# Patient Record
Sex: FEMALE | Race: WHITE | NOT HISPANIC OR LATINO | Employment: FULL TIME | URBAN - NONMETROPOLITAN AREA
[De-identification: names, ages, dates, MRNs, and addresses within clinical notes are randomized per-mention and may not be internally consistent; named-entity substitution may affect disease eponyms.]

---

## 2017-02-03 ENCOUNTER — OFFICE VISIT (OUTPATIENT)
Dept: RETAIL CLINIC | Facility: CLINIC | Age: 52
End: 2017-02-03

## 2017-02-03 VITALS
WEIGHT: 146.2 LBS | BODY MASS INDEX: 21.66 KG/M2 | RESPIRATION RATE: 14 BRPM | HEIGHT: 69 IN | TEMPERATURE: 99 F | OXYGEN SATURATION: 98 % | HEART RATE: 74 BPM

## 2017-02-03 DIAGNOSIS — J01.90 ACUTE SINUSITIS, RECURRENCE NOT SPECIFIED, UNSPECIFIED LOCATION: Primary | ICD-10-CM

## 2017-02-03 PROCEDURE — 99213 OFFICE O/P EST LOW 20 MIN: CPT | Performed by: NURSE PRACTITIONER

## 2017-02-03 RX ORDER — LEVOTHYROXINE AND LIOTHYRONINE 19; 4.5 UG/1; UG/1
30 TABLET ORAL DAILY
COMMUNITY

## 2017-02-03 RX ORDER — LEVOTHYROXINE SODIUM 0.1 MG/1
100 TABLET ORAL DAILY
COMMUNITY
End: 2017-02-03

## 2017-02-03 RX ORDER — FLUCONAZOLE 150 MG/1
150 TABLET ORAL DAILY
Qty: 2 TABLET | Refills: 0 | Status: SHIPPED | OUTPATIENT
Start: 2017-02-03 | End: 2017-04-18

## 2017-02-03 RX ORDER — AMOXICILLIN AND CLAVULANATE POTASSIUM 875; 125 MG/1; MG/1
1 TABLET, FILM COATED ORAL 2 TIMES DAILY
Qty: 20 TABLET | Refills: 0 | Status: SHIPPED | OUTPATIENT
Start: 2017-02-03 | End: 2017-02-13

## 2017-02-03 RX ORDER — PAROXETINE HYDROCHLORIDE 20 MG/1
20 TABLET, FILM COATED ORAL EVERY MORNING
COMMUNITY

## 2017-02-03 NOTE — PROGRESS NOTES
HPI Comments: Camille presents to the clinic today c/o sinus pressure/facial pain which started over a week ago. Associated symptoms include cough which is productive at times and wheezing. She has tried a number of otc medications without adequate relief in fact her symptoms are worsening. She does work at [employer] and has been exposed to multiple conditions. She had her Flu Vaccine this year. Refer to ROS for additional information.    URI    The current episode started 1 to 4 weeks ago. The problem has been rapidly worsening. The maximum temperature recorded prior to her arrival was 100.4 - 100.9 F. The fever has been present for 1 to 2 days. Associated symptoms include congestion, coughing, ear pain, rhinorrhea, sinus pain, a sore throat and wheezing. Pertinent negatives include no chest pain, nausea, rash or vomiting. She has tried acetaminophen, antihistamine, decongestant and NSAIDs for the symptoms. The treatment provided mild relief.      The following portions of the patient's history were reviewed and updated as appropriate: allergies, current medications, past family history, past medical history, past social history, past surgical history and problem list.    Review of Systems   Constitutional: Positive for appetite change, chills, fatigue and fever.   HENT: Positive for congestion, ear pain, postnasal drip, rhinorrhea, sinus pressure and sore throat.    Eyes: Negative for discharge and redness.   Respiratory: Positive for cough and wheezing. Negative for chest tightness and shortness of breath.    Cardiovascular: Negative for chest pain and leg swelling.   Gastrointestinal: Negative for nausea and vomiting.   Musculoskeletal: Negative for myalgias and neck stiffness.   Skin: Negative for color change and rash.   Hematological: Negative for adenopathy.   Psychiatric/Behavioral: Positive for sleep disturbance.   All other systems reviewed and are negative.    Physical Exam   Constitutional:  She is oriented to person, place, and time. She appears well-developed and well-nourished. No distress.   HENT:   Head: Normocephalic.   Right Ear: Ear canal normal. No tenderness. Tympanic membrane is bulging. Tympanic membrane is not scarred, not erythematous and not retracted. A middle ear effusion is present.   Left Ear: Ear canal normal. No tenderness. Tympanic membrane is bulging. Tympanic membrane is not scarred, not erythematous and not retracted. A middle ear effusion is present.   Nose: Mucosal edema and rhinorrhea present. No epistaxis. Right sinus exhibits maxillary sinus tenderness. Left sinus exhibits maxillary sinus tenderness.   Mouth/Throat: Uvula is midline and mucous membranes are normal. No uvula swelling. Oropharyngeal exudate and posterior oropharyngeal erythema present. No posterior oropharyngeal edema.   Eyes: Pupils are equal, round, and reactive to light. Right eye exhibits no discharge. Left eye exhibits no discharge. Right conjunctiva is not injected. Left conjunctiva is not injected.   Neck: Neck supple. No rigidity.   Cardiovascular: Normal rate, regular rhythm and normal heart sounds.    Pulmonary/Chest: Effort normal. No respiratory distress. She has no decreased breath sounds. She has no wheezes. She has no rhonchi. She has no rales.   Lymphadenopathy:     She has no cervical adenopathy.   Neurological: She is alert and oriented to person, place, and time.   Skin: Skin is warm and dry.   Psychiatric: She has a normal mood and affect. Her behavior is normal.   Nursing note and vitals reviewed.    Assessment/Plan   Diagnoses and all orders for this visit:    Acute sinusitis, recurrence not specified, unspecified location  Comments:  Findings and recommendations discussed with Camille. Treatment options reviewed.   Orders:  -     amoxicillin-clavulanate (AUGMENTIN) 875-125 MG per tablet; Take 1 tablet by mouth 2 (Two) Times a Day for 10 days. Take with food    Other orders  -      fluconazole (DIFLUCAN) 150 MG tablet; Take 1 tablet by mouth Daily.    Findings and recommendations discussed with Camille. Treatment options reviewed. Supportive care measure discussed. Camille does use a wood stove to heat her home and discussed humidification with the use of it. Encouraged smoking cessation.

## 2017-02-03 NOTE — PATIENT INSTRUCTIONS
Sinusitis, Adult  Sinusitis is redness, soreness, and puffiness (inflammation) of the air pockets in the bones of your face (sinuses). The redness, soreness, and puffiness can cause air and mucus to get trapped in your sinuses. This can allow germs to grow and cause an infection.   HOME CARE   · Drink enough fluids to keep your pee (urine) clear or pale yellow.  · Use a humidifier in your home.  · Run a hot shower to create steam in the bathroom. Sit in the bathroom with the door closed. Breathe in the steam 3-4 times a day.  · Put a warm, moist washcloth on your face 3-4 times a day, or as told by your doctor.  · Use salt water sprays (saline sprays) to wet the thick fluid in your nose. This can help the sinuses drain.  · Only take medicine as told by your doctor.  GET HELP RIGHT AWAY IF:   · Your pain gets worse.  · You have very bad headaches.  · You are sick to your stomach (nauseous).  · You throw up (vomit).  · You are very sleepy (drowsy) all the time.  · Your face is puffy (swollen).  · Your vision changes.  · You have a stiff neck.  · You have trouble breathing.  MAKE SURE YOU:   · Understand these instructions.  · Will watch your condition.  · Will get help right away if you are not doing well or get worse.     This information is not intended to replace advice given to you by your health care provider. Make sure you discuss any questions you have with your health care provider.     Document Released: 06/05/2009 Document Revised: 01/08/2016 Document Reviewed: 07/23/2013  [employer] Patient Education ©2016 [employer]  Smoking Cessation, Tips for Success  If you are ready to quit smoking, congratulations! You have chosen to help yourself be healthier. Cigarettes bring nicotine, tar, carbon monoxide, and other irritants into your body. Your lungs, heart, and blood vessels will be able to work better without these poisons. There are many different ways to quit smoking. Nicotine gum, nicotine  "patches, a nicotine inhaler, or nicotine nasal spray can help with physical craving. Hypnosis, support groups, and medicines help break the habit of smoking.  WHAT THINGS CAN I DO TO MAKE QUITTING EASIER?   Here are some tips to help you quit for good:  · Pick a date when you will quit smoking completely. Tell all of your friends and family about your plan to quit on that date.  · Do not try to slowly cut down on the number of cigarettes you are smoking. Pick a quit date and quit smoking completely starting on that day.  · Throw away all cigarettes.    · Clean and remove all ashtrays from your home, work, and car.  · On a card, write down your reasons for quitting. Carry the card with you and read it when you get the urge to smoke.  · Cleanse your body of nicotine. Drink enough water and fluids to keep your urine clear or pale yellow. Do this after quitting to flush the nicotine from your body.  · Learn to predict your moods. Do not let a bad situation be your excuse to have a cigarette. Some situations in your life might tempt you into wanting a cigarette.  · Never have \"just one\" cigarette. It leads to wanting another and another. Remind yourself of your decision to quit.  · Change habits associated with smoking. If you smoked while driving or when feeling stressed, try other activities to replace smoking. Stand up when drinking your coffee. Brush your teeth after eating. Sit in a different chair when you read the paper. Avoid alcohol while trying to quit, and try to drink fewer caffeinated beverages. Alcohol and caffeine may urge you to smoke.  · Avoid foods and drinks that can trigger a desire to smoke, such as sugary or spicy foods and alcohol.  · Ask people who smoke not to smoke around you.  · Have something planned to do right after eating or having a cup of coffee. For example, plan to take a walk or exercise.  · Try a relaxation exercise to calm you down and decrease your stress. Remember, you may be tense " "and nervous for the first 2 weeks after you quit, but this will pass.  · Find new activities to keep your hands busy. Play with a pen, coin, or rubber band. Doodle or draw things on paper.  · Brush your teeth right after eating. This will help cut down on the craving for the taste of tobacco after meals. You can also try mouthwash.    · Use oral substitutes in place of cigarettes. Try using lemon drops, carrots, cinnamon sticks, or chewing gum. Keep them handy so they are available when you have the urge to smoke.  · When you have the urge to smoke, try deep breathing.  · Designate your home as a nonsmoking area.  · If you are a heavy smoker, ask your health care provider about a prescription for nicotine chewing gum. It can ease your withdrawal from nicotine.  · Reward yourself. Set aside the cigarette money you save and buy yourself something nice.  · Look for support from others. Join a support group or smoking cessation program. Ask someone at home or at work to help you with your plan to quit smoking.  · Always ask yourself, \"Do I need this cigarette or is this just a reflex?\" Tell yourself, \"Today, I choose not to smoke,\" or \"I do not want to smoke.\" You are reminding yourself of your decision to quit.  · Do not replace cigarette smoking with electronic cigarettes (commonly called e-cigarettes). The safety of e-cigarettes is unknown, and some may contain harmful chemicals.  · If you relapse, do not give up! Plan ahead and think about what you will do the next time you get the urge to smoke.  HOW WILL I FEEL WHEN I QUIT SMOKING?  You may have symptoms of withdrawal because your body is used to nicotine (the addictive substance in cigarettes). You may crave cigarettes, be irritable, feel very hungry, cough often, get headaches, or have difficulty concentrating. The withdrawal symptoms are only temporary. They are strongest when you first quit but will go away within 10-14 days. When withdrawal symptoms occur, " stay in control. Think about your reasons for quitting. Remind yourself that these are signs that your body is healing and getting used to being without cigarettes. Remember that withdrawal symptoms are easier to treat than the major diseases that smoking can cause.   Even after the withdrawal is over, expect periodic urges to smoke. However, these cravings are generally short lived and will go away whether you smoke or not. Do not smoke!  WHAT RESOURCES ARE AVAILABLE TO HELP ME QUIT SMOKING?  Your health care provider can direct you to community resources or hospitals for support, which may include:  · Group support.  · Education.  · Hypnosis.  · Therapy.     This information is not intended to replace advice given to you by your health care provider. Make sure you discuss any questions you have with your health care provider.

## 2017-02-25 ENCOUNTER — OFFICE VISIT (OUTPATIENT)
Dept: RETAIL CLINIC | Facility: CLINIC | Age: 52
End: 2017-02-25

## 2017-02-25 VITALS
RESPIRATION RATE: 20 BRPM | WEIGHT: 141.8 LBS | OXYGEN SATURATION: 89 % | HEART RATE: 95 BPM | TEMPERATURE: 98.1 F | BODY MASS INDEX: 20.94 KG/M2

## 2017-02-25 DIAGNOSIS — R50.9 FEVER, UNSPECIFIED FEVER CAUSE: ICD-10-CM

## 2017-02-25 DIAGNOSIS — R05.8 PRODUCTIVE COUGH: Primary | ICD-10-CM

## 2017-02-25 PROCEDURE — 99212 OFFICE O/P EST SF 10 MIN: CPT | Performed by: NURSE PRACTITIONER

## 2017-02-25 NOTE — PROGRESS NOTES
Subjective   Camille Garvin is a 51 y.o. female.   Chief Complaint   Patient presents with   • Flu Symptoms    Camille presents to the clinic today c/o cough which started about 2 weeks ago. Associated symptoms include fever of 100 that has been present intermittently for a week.  Cough is productive cough.  She was seen here about 3 week ago and prescribed Augmentin for a sinus infection.  She completed her antibiotic and noticed some relief in symptoms initially.  Refer to ROS for additional information.    Flu Symptoms   This is a new problem. The current episode started 1 to 4 weeks ago. The problem has been gradually worsening. Associated symptoms include chills, congestion, coughing, fatigue, a fever, headaches, nausea and weakness. She has tried acetaminophen for the symptoms.        The following portions of the patient's history were reviewed and updated as appropriate: allergies, current medications, past family history, past medical history, past social history, past surgical history and problem list.    Review of Systems   Constitutional: Positive for chills, fatigue and fever.   HENT: Positive for congestion.    Respiratory: Positive for cough and chest tightness. Negative for shortness of breath.    Gastrointestinal: Positive for nausea.   Neurological: Positive for weakness and headaches.       No Known Allergies    Visit Vitals   • Pulse 95   • Temp 98.1 °F (36.7 °C) (Temporal Artery )   • Resp 20   • Wt 141 lb 12.8 oz (64.3 kg)   • SpO2 (!) 89%   • BMI 20.94 kg/m2         Objective     Physical Exam   Constitutional: She appears well-developed and well-nourished.   Cardiovascular: Normal rate and regular rhythm.    Pulmonary/Chest: Effort normal. She has wheezes. She has rhonchi.   Nursing note and vitals reviewed.      Assessment/Plan   Camille was seen today for flu symptoms.    Diagnoses and all orders for this visit:    Productive cough    Fever, unspecified fever cause               Explained to  patient that due to her oxygen saturation and the fever that has persisted for one week I felt that she needed to seek a higher level of care.  Discussed being seen at urgent care or the Emergency Department.  Patient verbalized understanding and said that she would go to the urgent care.

## 2017-04-18 ENCOUNTER — OFFICE VISIT (OUTPATIENT)
Dept: RETAIL CLINIC | Facility: CLINIC | Age: 52
End: 2017-04-18

## 2017-04-18 VITALS
HEART RATE: 108 BPM | SYSTOLIC BLOOD PRESSURE: 99 MMHG | OXYGEN SATURATION: 96 % | RESPIRATION RATE: 18 BRPM | DIASTOLIC BLOOD PRESSURE: 64 MMHG | BODY MASS INDEX: 20.59 KG/M2 | WEIGHT: 139.4 LBS | TEMPERATURE: 100.5 F

## 2017-04-18 DIAGNOSIS — J02.9 SORE THROAT: ICD-10-CM

## 2017-04-18 DIAGNOSIS — R50.9 FEVER, UNSPECIFIED FEVER CAUSE: ICD-10-CM

## 2017-04-18 DIAGNOSIS — R05.9 COUGH: Primary | ICD-10-CM

## 2017-04-18 LAB
EXPIRATION DATE: NORMAL
EXPIRATION DATE: NORMAL
FLUAV AG NPH QL: NORMAL
FLUBV AG NPH QL: NORMAL
INTERNAL CONTROL: NORMAL
INTERNAL CONTROL: NORMAL
Lab: NORMAL
Lab: NORMAL
S PYO AG THROAT QL: NEGATIVE

## 2017-04-18 PROCEDURE — 99213 OFFICE O/P EST LOW 20 MIN: CPT | Performed by: NURSE PRACTITIONER

## 2017-04-18 PROCEDURE — 87880 STREP A ASSAY W/OPTIC: CPT | Performed by: NURSE PRACTITIONER

## 2017-04-18 PROCEDURE — 87804 INFLUENZA ASSAY W/OPTIC: CPT | Performed by: NURSE PRACTITIONER

## 2017-04-18 RX ORDER — ALBUTEROL SULFATE 90 UG/1
2 AEROSOL, METERED RESPIRATORY (INHALATION) EVERY 4 HOURS PRN
COMMUNITY
End: 2017-11-18

## 2017-04-18 RX ORDER — LEVOFLOXACIN 500 MG/1
500 TABLET, FILM COATED ORAL DAILY
Qty: 5 TABLET | Refills: 0 | Status: SHIPPED | OUTPATIENT
Start: 2017-04-18 | End: 2017-11-18

## 2017-04-18 NOTE — PROGRESS NOTES
"Subjective   Camille Garvin is a 51 y.o. female.     Chief Complaint   Patient presents with   • Flu Symptoms      History of Present Illness   Presents to [facility] with c/o \"flu symptoms\" describes as body aches with low grade temperature for past few days. Has had associated productive cough with pale yellow colored sputum, cough and wheezing at times. Denies any chest pain. Has PMH of pneumonia approximately 2 months ago, hx of COPD and cigarette smoker. Taking OTC medication for the cough.   Possible ill contact with flu and strep at work.     The following portions of the patient's history were reviewed and updated as appropriate: allergies, current medications, past family history, past medical history, past social history, past surgical history and problem list.      Current Outpatient Prescriptions:   •  albuterol (PROVENTIL HFA;VENTOLIN HFA) 108 (90 BASE) MCG/ACT inhaler, Inhale 2 puffs Every 4 (Four) Hours As Needed for Wheezing., Disp: , Rfl:   •  MULTIPLE VITAMINS PO, Take  by mouth., Disp: , Rfl:   •  PARoxetine (PAXIL) 20 MG tablet, Take 20 mg by mouth Every Morning., Disp: , Rfl:   •  Thyroid 30 MG PO tablet, Take 30 mg by mouth Daily., Disp: , Rfl:   •  levoFLOXacin (LEVAQUIN) 500 MG tablet, Take 1 tablet by mouth Daily., Disp: 5 tablet, Rfl: 0    BP 99/64  Pulse 108  Temp 100.5 °F (38.1 °C) (Oral)   Resp 18  Wt 139 lb 6.4 oz (63.2 kg)  SpO2 96%  BMI 20.59 kg/m2    Review of Systems   Constitutional: Positive for chills and fever.   HENT: Positive for postnasal drip, rhinorrhea and sore throat. Negative for ear pain and sinus pressure.    Eyes: Negative for itching.   Respiratory: Positive for cough.    Gastrointestinal: Negative for vomiting.   Skin: Negative for color change.   Neurological: Negative for dizziness.      No Known Allergies    Physical Exam   Constitutional: She is oriented to person, place, and time. She appears well-developed and well-nourished.   HENT:   Head: Normocephalic.   Right " Ear: Tympanic membrane normal.   Left Ear: Tympanic membrane normal.   Mouth/Throat: No uvula swelling. Posterior oropharyngeal erythema (mild) present.   Cardiovascular: Regular rhythm.    Pulmonary/Chest: Effort normal. Decreased breath sounds: RLL. She has no wheezes. She has no rhonchi. She has no rales.   Neurological: She is alert and oriented to person, place, and time.   Skin: Skin is warm and dry.     Assessment/Plan     Camille was seen today for flu symptoms.    Diagnoses and all orders for this visit:    Cough  -     levoFLOXacin (LEVAQUIN) 500 MG tablet; Take 1 tablet by mouth Daily.    Fever, unspecified fever cause  -     levoFLOXacin (LEVAQUIN) 500 MG tablet; Take 1 tablet by mouth Daily.  -     POC Influenza A / B    Sore throat  -     POC Rapid Strep A      Results for orders placed or performed in visit on 04/18/17   POC Influenza A / B   Result Value Ref Range    Rapid Influenza A Ag neg     Rapid Influenza B Ag neg     Internal Control Passed Passed    Lot Number [id number]     Expiration Date 2/2019    POC Rapid Strep A   Result Value Ref Range    Rapid Strep A Screen Negative Negative, VALID, INVALID, Not Performed    Internal Control Passed Passed    Lot Number [id number]     Expiration Date 09/30/2018         Continue home medication for the cough. Discussed f/u   with PCP or at the Urgent Care if symptoms worsen or fail to improve within next 24-48 hours.  Patient teaching information discussed and provided to the patient. Patient verbalized understanding.      April 18, 2017 11:55 AM

## 2017-04-18 NOTE — PATIENT INSTRUCTIONS
"      Upper Respiratory Infection, Adult  Most upper respiratory infections (URIs) are caused by a virus. A URI affects the nose, throat, and upper air passages. The most common type of URI is often called \"the common cold.\"  HOME CARE   · Take medicines only as told by your doctor.  · Gargle warm saltwater or take cough drops to comfort your throat as told by your doctor.  · Use a warm mist humidifier or inhale steam from a shower to increase air moisture. This may make it easier to breathe.  · Drink enough fluid to keep your pee (urine) clear or pale yellow.  · Eat soups and other clear broths.  · Have a healthy diet.  · Rest as needed.  · Go back to work when your fever is gone or your doctor says it is okay.  ¨ You may need to stay home longer to avoid giving your URI to others.  ¨ You can also wear a face mask and wash your hands often to prevent spread of the virus.  · Use your inhaler more if you have asthma.  · Do not use any tobacco products, including cigarettes, chewing tobacco, or electronic cigarettes. If you need help quitting, ask your doctor.  GET HELP IF:  · You are getting worse, not better.  · Your symptoms are not helped by medicine.  · You have chills.  · You are getting more short of breath.  · You have brown or red mucus.  · You have yellow or brown discharge from your nose.  · You have pain in your face, especially when you bend forward.  · You have a fever.  · You have puffy (swollen) neck glands.  · You have pain while swallowing.  · You have white areas in the back of your throat.  GET HELP RIGHT AWAY IF:   · You have very bad or constant:    Headache.    Ear pain.    Pain in your forehead, behind your eyes, and over your cheekbones (sinus pain).    Chest pain.  · You have long-lasting (chronic) lung disease and any of the following:    Wheezing.    Long-lasting cough.    Coughing up blood.    A change in your usual mucus.  · You have a stiff neck.  · You have changes in your:    Vision.   "  Hearing.    Thinking.    Mood.  MAKE SURE YOU:   · Understand these instructions.  · Will watch your condition.  · Will get help right away if you are not doing well or get worse.     This information is not intended to replace advice given to you by your health care provider. Make sure you discuss any questions you have with your health care provider.     Document Released: 06/05/2009 Document Revised: 05/03/2016 Document Reviewed: 03/25/2015  [employer] Interactive Patient Education ©2016 [employer]    Schedule a f/u a visit with your PCP within nex 5-7 days. If symptoms fail to improve or worsen f/u at  or ED.

## 2017-11-18 ENCOUNTER — OFFICE VISIT (OUTPATIENT)
Dept: RETAIL CLINIC | Facility: CLINIC | Age: 52
End: 2017-11-18

## 2017-11-18 VITALS — HEART RATE: 79 BPM | WEIGHT: 142.6 LBS | OXYGEN SATURATION: 97 % | TEMPERATURE: 99 F | BODY MASS INDEX: 21.06 KG/M2

## 2017-11-18 DIAGNOSIS — J06.9 URI, ACUTE: Primary | ICD-10-CM

## 2017-11-18 PROCEDURE — 99213 OFFICE O/P EST LOW 20 MIN: CPT | Performed by: NURSE PRACTITIONER

## 2017-11-18 RX ORDER — FLUTICASONE PROPIONATE 50 MCG
2 SPRAY, SUSPENSION (ML) NASAL DAILY
Qty: 1 BOTTLE | Refills: 0 | Status: SHIPPED | OUTPATIENT
Start: 2017-11-18

## 2017-11-18 RX ORDER — VARENICLINE TARTRATE 0.5 MG/1
0.5 TABLET, FILM COATED ORAL 2 TIMES DAILY
COMMUNITY

## 2017-11-18 RX ORDER — AZITHROMYCIN 250 MG/1
TABLET, FILM COATED ORAL
Qty: 6 TABLET | Refills: 0 | Status: SHIPPED | OUTPATIENT
Start: 2017-11-18 | End: 2019-05-11

## 2017-11-18 NOTE — PATIENT INSTRUCTIONS
"Upper Respiratory Infection, Adult  Most upper respiratory infections (URIs) are a viral infection of the air passages leading to the lungs. A URI affects the nose, throat, and upper air passages. The most common type of URI is nasopharyngitis and is typically referred to as \"the common cold.\"  URIs run their course and usually go away on their own. Most of the time, a URI does not require medical attention, but sometimes a bacterial infection in the upper airways can follow a viral infection. This is called a secondary infection. Sinus and middle ear infections are common types of secondary upper respiratory infections.  Bacterial pneumonia can also complicate a URI. A URI can worsen asthma and chronic obstructive pulmonary disease (COPD). Sometimes, these complications can require emergency medical care and may be life threatening.   CAUSES  Almost all URIs are caused by viruses. A virus is a type of germ and can spread from one person to another.   RISKS FACTORS  You may be at risk for a URI if:   · You smoke.    · You have chronic heart or lung disease.  · You have a weakened defense (immune) system.    · You are very young or very old.    · You have nasal allergies or asthma.  · You work in crowded or poorly ventilated areas.  · You work in health care facilities or schools.  SIGNS AND SYMPTOMS   Symptoms typically develop 2-3 days after you come in contact with a cold virus. Most viral URIs last 7-10 days. However, viral URIs from the influenza virus (flu virus) can last 14-18 days and are typically more severe. Symptoms may include:   · Runny or stuffy (congested) nose.    · Sneezing.    · Cough.    · Sore throat.    · Headache.    · Fatigue.    · Fever.    · Loss of appetite.    · Pain in your forehead, behind your eyes, and over your cheekbones (sinus pain).  · Muscle aches.    DIAGNOSIS   Your health care provider may diagnose a URI by:  · Physical exam.  · Tests to check that your symptoms are not due to " another condition such as:  ¨ Strep throat.  ¨ Sinusitis.  ¨ Pneumonia.  ¨ Asthma.  TREATMENT   A URI goes away on its own with time. It cannot be cured with medicines, but medicines may be prescribed or recommended to relieve symptoms. Medicines may help:  · Reduce your fever.  · Reduce your cough.  · Relieve nasal congestion.  HOME CARE INSTRUCTIONS   · Take medicines only as directed by your health care provider.    · Gargle warm saltwater or take cough drops to comfort your throat as directed by your health care provider.  · Use a warm mist humidifier or inhale steam from a shower to increase air moisture. This may make it easier to breathe.  · Drink enough fluid to keep your urine clear or pale yellow.    · Eat soups and other clear broths and maintain good nutrition.    · Rest as needed.    · Return to work when your temperature has returned to normal or as your health care provider advises. You may need to stay home longer to avoid infecting others. You can also use a face mask and careful hand washing to prevent spread of the virus.  · Increase the usage of your inhaler if you have asthma.    · Do not use any tobacco products, including cigarettes, chewing tobacco, or electronic cigarettes. If you need help quitting, ask your health care provider.  PREVENTION   The best way to protect yourself from getting a cold is to practice good hygiene.   · Avoid oral or hand contact with people with cold symptoms.    · Wash your hands often if contact occurs.    There is no clear evidence that vitamin C, vitamin E, echinacea, or exercise reduces the chance of developing a cold. However, it is always recommended to get plenty of rest, exercise, and practice good nutrition.   SEEK MEDICAL CARE IF:   · You are getting worse rather than better.    · Your symptoms are not controlled by medicine.    · You have chills.  · You have worsening shortness of breath.  · You have brown or red mucus.  · You have yellow or brown nasal  discharge.  · You have pain in your face, especially when you bend forward.  · You have a fever.  · You have swollen neck glands.  · You have pain while swallowing.  · You have white areas in the back of your throat.  SEEK IMMEDIATE MEDICAL CARE IF:   · You have severe or persistent:    Headache.    Ear pain.    Sinus pain.    Chest pain.  · You have chronic lung disease and any of the following:    Wheezing.    Prolonged cough.    Coughing up blood.    A change in your usual mucus.  · You have a stiff neck.  · You have changes in your:    Vision.    Hearing.    Thinking.    Mood.  MAKE SURE YOU:   · Understand these instructions.  · Will watch your condition.  · Will get help right away if you are not doing well or get worse.     This information is not intended to replace advice given to you by your health care provider. Make sure you discuss any questions you have with your health care provider.     Document Released: 06/13/2002 Document Revised: 05/03/2016 Document Reviewed: 03/25/2015  [employer] Interactive Patient Education ©2017 [employer]

## 2017-11-18 NOTE — PROGRESS NOTES
Subjective   Camille Garvin is a 52 y.o. female.     Chief Complaint   Patient presents with   • Nasal Congestion   • Cough      Cough   This is a new problem. The current episode started in the past 7 days. The problem has been gradually worsening. The cough is productive of purulent sputum. Associated symptoms include ear congestion, headaches, myalgias, nasal congestion, postnasal drip, rhinorrhea and a sore throat. Pertinent negatives include no chest pain, chills, ear pain, fever or wheezing. Nothing aggravates the symptoms. Treatments tried: Benadryl. The treatment provided mild relief. Current every day smoker      The following portions of the patient's history were reviewed and updated as appropriate: allergies, current medications, past family history, past medical history, past social history, past surgical history and problem list.      Current Outpatient Prescriptions:   •  azithromycin (ZITHROMAX Z-ALIX) 250 MG tablet, Take 2 tablets the first day, then 1 tablet daily for 4 days., Disp: 6 tablet, Rfl: 0  •  fluticasone (FLONASE) 50 MCG/ACT nasal spray, 2 sprays into each nostril Daily., Disp: 1 bottle, Rfl: 0  •  MULTIPLE VITAMINS PO, Take  by mouth., Disp: , Rfl:   •  PARoxetine (PAXIL) 20 MG tablet, Take 20 mg by mouth every morning., Disp: , Rfl:   •  PARoxetine (PAXIL) 20 MG tablet, Take 20 mg by mouth Every Morning., Disp: , Rfl:   •  thyroid (ARMOUR) 120 MG tablet, Take 120 mg by mouth daily., Disp: , Rfl:   •  Thyroid 30 MG PO tablet, Take 30 mg by mouth Daily., Disp: , Rfl:   •  varenicline (CHANTIX) 0.5 MG tablet, Take 0.5 mg by mouth 2 (Two) Times a Day., Disp: , Rfl:     Pulse 79  Temp 99 °F (37.2 °C)  Wt 142 lb 9.6 oz (64.7 kg)  SpO2 97%  BMI 21.06 kg/m2    Review of Systems   Constitutional: Negative for chills and fever.   HENT: Positive for congestion, postnasal drip, rhinorrhea and sore throat. Negative for ear pain and sinus pressure.    Respiratory: Positive for cough (productive  yellow sputum). Negative for wheezing.    Cardiovascular: Negative for chest pain.   Musculoskeletal: Positive for myalgias.   Skin: Negative for color change.   Neurological: Positive for headaches.     No Known Allergies    Physical Exam   Constitutional: She is oriented to person, place, and time. She appears well-developed and well-nourished.   HENT:   Head: Normocephalic.   Right Ear: Tympanic membrane is bulging. Tympanic membrane is not erythematous.   Left Ear: Tympanic membrane is bulging. Tympanic membrane is not erythematous.   Nose: Mucosal edema and rhinorrhea present.   Mouth/Throat: Posterior oropharyngeal erythema present. No posterior oropharyngeal edema.   Eyes: Conjunctivae are normal.   Cardiovascular: Normal rate and regular rhythm.    Pulmonary/Chest: Effort normal and breath sounds normal. She has no wheezes.   Lymphadenopathy:     She has no cervical adenopathy.   Neurological: She is alert and oriented to person, place, and time.   Skin: Skin is warm and dry.      Assessment/Plan     Camille was seen today for nasal congestion and cough.    Diagnoses and all orders for this visit:    URI, acute  -     azithromycin (ZITHROMAX Z-ALIX) 250 MG tablet; Take 2 tablets the first day, then 1 tablet daily for 4 days.  -     fluticasone (FLONASE) 50 MCG/ACT nasal spray; 2 sprays into each nostril Daily.         Discussed PE findings and treatment plan.   Follow up with PCP or at the Urgent Care if symptoms worsen or fail to improve.  Patient teaching information discussed and provided to the patient. Patient verbalized understanding.      November 18, 2017 11:15 AM

## 2019-05-11 ENCOUNTER — OFFICE VISIT (OUTPATIENT)
Dept: RETAIL CLINIC | Facility: CLINIC | Age: 54
End: 2019-05-11

## 2019-05-11 VITALS
RESPIRATION RATE: 18 BRPM | DIASTOLIC BLOOD PRESSURE: 60 MMHG | WEIGHT: 131.4 LBS | BODY MASS INDEX: 19.4 KG/M2 | TEMPERATURE: 99.2 F | SYSTOLIC BLOOD PRESSURE: 100 MMHG | HEART RATE: 80 BPM | OXYGEN SATURATION: 98 %

## 2019-05-11 DIAGNOSIS — J06.9 URI, ACUTE: ICD-10-CM

## 2019-05-11 DIAGNOSIS — J30.89 ENVIRONMENTAL AND SEASONAL ALLERGIES: Primary | ICD-10-CM

## 2019-05-11 PROCEDURE — 99213 OFFICE O/P EST LOW 20 MIN: CPT | Performed by: NURSE PRACTITIONER

## 2019-05-11 RX ORDER — METHYLPREDNISOLONE 4 MG/1
TABLET ORAL
Qty: 1 EACH | Refills: 0 | Status: SHIPPED | OUTPATIENT
Start: 2019-05-11

## 2019-05-11 RX ORDER — AMOXICILLIN AND CLAVULANATE POTASSIUM 875; 125 MG/1; MG/1
1 TABLET, FILM COATED ORAL 2 TIMES DAILY
Qty: 20 TABLET | Refills: 0 | Status: SHIPPED | OUTPATIENT
Start: 2019-05-11 | End: 2019-05-21

## 2019-05-11 NOTE — PATIENT INSTRUCTIONS
Allergies, Adult  An allergy means that your body reacts to something that bothers it (allergen). It is not a normal reaction. This can happen from something that you:  · Eat.  · Breathe in.  · Touch.    You can have an allergy (be allergic) to:  · Outdoor things, like:  ? Pollen.  ? Grass.  ? Weeds.  · Indoor things, like:  ? Dust.  ? Smoke.  ? Pet dander.  · Foods.  · Medicines.  · Things that bother your skin, like:  ? Detergents.  ? Chemicals.  ? Latex.  · Perfume.  · Bugs.    An allergy cannot spread from person to person (is not contagious).  Follow these instructions at home:  · Stay away from things that you know you are allergic to.  · If you have allergies to things in the air, wash out your nose each day. Do it with one of these:  ? A salt-water (saline) spray.  ? A container (neti pot).  · Take over-the-counter and prescription medicines only as told by your doctor.  · Keep all follow-up visits as told by your doctor. This is important.  · If you are at risk for a very bad allergy reaction (anaphylaxis), keep an auto-injector with you all the time. This is called an epinephrine injection.  ? This is pre-measured medicine with a needle. You can put it into your skin by yourself.  ? Right after you have a very bad allergy reaction, you or a person with you must give the medicine in less than a few minutes. This is an emergency.  · If you have ever had a very bad allergy reaction, wear a medical alert bracelet or necklace. Your very bad allergy should be written on it.  Contact a health care provider if:  · Your symptoms do not get better with treatment.  Get help right away if:  · You have symptoms of a very bad allergy reaction. These include:  ? A swollen mouth, tongue, or throat.  ? Pain or tightness in your chest.  ? Trouble breathing.  ? Being short of breath.  ? Dizziness.  ? Fainting.  ? Very bad pain in your belly (abdomen).  ? Throwing up (vomiting).  ? Watery poop (diarrhea).  Summary  · An  allergy means that your body reacts to something that bothers it (allergen). It is not a normal reaction.  · Stay away from things that make your body react.  · Take over-the-counter and prescription medicines only as told by your doctor.  · If you are at risk for a very bad allergy reaction, carry an auto-injector (epinephrine injection) all the time. Also, wear a medical alert bracelet or necklace so people know about your allergy.  This information is not intended to replace advice given to you by your health care provider. Make sure you discuss any questions you have with your health care provider.  Document Released: 04/14/2014 Document Revised: 04/02/2018 Document Reviewed: 04/02/2018  [employer] Interactive Patient Education © 2019 [employer]    Nasal Allergies  Nasal allergies are a reaction to allergens in the air. Allergens are particles in the air that cause your body to have an allergic reaction. Nasal allergies are not passed from person to person (are not contagious). They cannot be cured, but they can be controlled.  What are the causes?  Seasonal nasal allergies (hay fever) are caused by pollen allergens that come from grasses, trees, and weeds. Year-round nasal allergies (perennial allergic rhinitis) are caused by allergens such as house dust mites, pet dander, and mold spores.  What increases the risk?  The following factors may make you more likely to develop this condition:  · Having certain health conditions. These include:  ? Other types of allergies, such as food allergies.  ? Asthma.  ? Eczema.  · Having a close relative who has allergies or asthma.  · Exposure to house dust, pollen, dander, or other allergens at home or at work.  · Exposure to air pollution or secondhand smoke when you were a child.    What are the signs or symptoms?  Symptoms of this condition include:  · Sneezing.  · Runny nose or stuffy nose (congestion).  · Watery (tearing) eyes.  · Itchy eyes, nose, mouth, throat,  skin, or other area.  · Sore throat.  · Headache.  · Decreased sense of smell or taste.  · Fatigue. This may occur if you have trouble sleeping due to allergies.  · Swollen eyelids.    How is this diagnosed?  This condition is diagnosed with a medical history and physical exam. Allergy testing may be done to determine exactly what triggers your nasal allergies.  How is this treated?  There is no cure for nasal allergies. Treatment focuses on controlling your symptoms, and it may include:  · Medicines that block allergy symptoms. These may include allergy shots, nasal sprays, and oral antihistamines.  · Avoiding the allergen.    Follow these instructions at home:  · Avoid the allergen that is causing your symptoms, if possible.  · Keep windows closed. If possible, use air conditioning when pollen counts are high.  · Do not use fans in your home.  · Do not hang clothes outside to dry.  · Wear sunglasses to keep pollen out of your eyes.  · Wash your hands right away after you touch household pets.  · Take over-the-counter and prescription medicines only as told by your health care provider.  · Keep all follow-up visits as told by your health care provider. This is important.  Contact a health care provider if:  · You have a fever.  · You develop a cough that does not go away (is persistent).  · You start to wheeze.  · Your symptoms do not improve with treatment.  · You have thick nasal discharge.  · You start to have nosebleeds.  Get help right away if:  · Your tongue or your lips are swollen.  · You have trouble breathing.  · You feel light-headed or you feel like you are going to faint.  · You have cold sweats.  This information is not intended to replace advice given to you by your health care provider. Make sure you discuss any questions you have with your health care provider.  Document Released: 12/18/2006 Document Revised: 05/22/2017 Document Reviewed: 06/29/2016  [employer] Interactive Patient Education © 2019  "[employer]    Upper Respiratory Infection, Adult  An upper respiratory infection (URI) affects the nose, throat, and upper air passages. URIs are caused by germs (viruses). The most common type of URI is often called \"the common cold.\"  Medicines cannot cure URIs, but you can do things at home to relieve your symptoms. URIs usually get better within 7-10 days.  Follow these instructions at home:  Activity  · Rest as needed.  · If you have a fever, stay home from work or school until your fever is gone, or until your doctor says you may return to work or school.  ? You should stay home until you cannot spread the infection anymore (you are not contagious).  ? Your doctor may have you wear a face mask so you have less risk of spreading the infection.  Relieving symptoms  · Gargle with a salt-water mixture 3-4 times a day or as needed. To make a salt-water mixture, completely dissolve ½-1 tsp of salt in 1 cup of warm water.  · Use a cool-mist humidifier to add moisture to the air. This can help you breathe more easily.  Eating and drinking  · Drink enough fluid to keep your pee (urine) pale yellow.  · Eat soups and other clear broths.  General instructions  · Take over-the-counter and prescription medicines only as told by your doctor. These include cold medicines, fever reducers, and cough suppressants.  · Do not use any products that contain nicotine or tobacco. These include cigarettes and e-cigarettes. If you need help quitting, ask your doctor.  · Avoid being where people are smoking (avoid secondhand smoke).  · Make sure you get regular shots and get the flu shot every year.  · Keep all follow-up visits as told by your doctor. This is important.  How to avoid spreading infection to others  · Wash your hands often with soap and water. If you do not have soap and water, use hand .  · Avoid touching your mouth, face, eyes, or nose.  · Cough or sneeze into a tissue or your sleeve or elbow. Do not cough or " "sneeze into your hand or into the air.  Contact a doctor if:  · You are getting worse, not better.  · You have any of these:  ? A fever.  ? Chills.  ? Brown or red mucus in your nose.  ? Yellow or brown fluid (discharge)coming from your nose.  ? Pain in your face, especially when you bend forward.  ? Swollen neck glands.  ? Pain with swallowing.  ? White areas in the back of your throat.  Get help right away if:  · You have shortness of breath that gets worse.  · You have very bad or constant:  ? Headache.  ? Ear pain.  ? Pain in your forehead, behind your eyes, and over your cheekbones (sinus pain).  ? Chest pain.  · You have long-lasting (chronic) lung disease along with any of these:  ? Wheezing.  ? Long-lasting cough.  ? Coughing up blood.  ? A change in your usual mucus.  · You have a stiff neck.  · You have changes in your:  ? Vision.  ? Hearing.  ? Thinking.  ? Mood.  Summary  · An upper respiratory infection (URI) is caused by a germ called a virus. The most common type of URI is often called \"the common cold.\"  · URIs usually get better within 7-10 days.  · Take over-the-counter and prescription medicines only as told by your doctor.  This information is not intended to replace advice given to you by your health care provider. Make sure you discuss any questions you have with your health care provider.  Document Released: 06/05/2009 Document Revised: 08/10/2018 Document Reviewed: 08/10/2018  [employer] Interactive Patient Education © 2019 [employer]    "

## 2019-05-11 NOTE — PROGRESS NOTES
MARTAMerlene Garvin is a 53 y.o. female.   Chief Complaint   Patient presents with   • Allergies      Allergies   This is a new problem. Episode onset: 2 days ago. The problem has been rapidly worsening. Associated symptoms include chills, congestion, coughing, fatigue, headaches and a sore throat (scratchy). Pertinent negatives include no fever, joint swelling, myalgias, nausea, rash, swollen glands or vomiting. Nothing aggravates the symptoms. Treatments tried: Zyrtec. The treatment provided mild relief.   Presents with c/o of itchy eyes, facial pressure with nasal congestion with rapid onset and worsening of symptoms.    The following portions of the patient's history were reviewed and updated as appropriate: allergies, current medications, past family history, past medical history, past social history, past surgical history and problem list.    Current Outpatient Medications:   •  fluticasone (FLONASE) 50 MCG/ACT nasal spray, 2 sprays into each nostril Daily., Disp: 1 bottle, Rfl: 0  •  MULTIPLE VITAMINS PO, Take  by mouth., Disp: , Rfl:   •  PARoxetine (PAXIL) 20 MG tablet, Take 20 mg by mouth every morning., Disp: , Rfl:   •  PARoxetine (PAXIL) 20 MG tablet, Take 20 mg by mouth Every Morning., Disp: , Rfl:   •  thyroid (ARMOUR) 120 MG tablet, Take 120 mg by mouth daily., Disp: , Rfl:   •  Thyroid 30 MG PO tablet, Take 30 mg by mouth Daily., Disp: , Rfl:   •  varenicline (CHANTIX) 0.5 MG tablet, Take 0.5 mg by mouth 2 (Two) Times a Day., Disp: , Rfl:   •  amoxicillin-clavulanate (AUGMENTIN) 875-125 MG per tablet, Take 1 tablet by mouth 2 (Two) Times a Day for 10 days., Disp: 20 tablet, Rfl: 0  •  loratadine-pseudoephedrine (CLARITIN-D 12 HOUR) 5-120 MG per 12 hr tablet, Take 1 tablet by mouth 2 (Two) Times a Day., Disp: 20 tablet, Rfl: 0  •  methylPREDNISolone (MEDROL, ALIX,) 4 MG tablet, Take as directed on package instructions., Disp: 1 each, Rfl: 0    No Known Allergies    Review of Systems    Constitutional: Positive for chills and fatigue. Negative for activity change, appetite change and fever.   HENT: Positive for congestion, postnasal drip, rhinorrhea, sinus pressure and sore throat (scratchy). Negative for sinus pain.    Eyes: Positive for photophobia, discharge (clear watery), redness and itching. Negative for pain and visual disturbance.   Respiratory: Positive for cough. Negative for shortness of breath and wheezing.    Gastrointestinal: Negative for nausea and vomiting.   Musculoskeletal: Negative for joint swelling and myalgias.   Skin: Negative for color change, pallor and rash.   Allergic/Immunologic: Positive for environmental allergies and immunocompromised state.   Neurological: Positive for headaches.   Psychiatric/Behavioral: Positive for sleep disturbance.     Objective     Visit Vitals  /60   Pulse 80   Temp 99.2 °F (37.3 °C)   Resp 18   Wt 59.6 kg (131 lb 6.4 oz)   SpO2 98%   BMI 19.40 kg/m²     Physical Exam   Constitutional: She is oriented to person, place, and time. She appears well-developed and well-nourished.   HENT:   Head: Normocephalic.   Right Ear: Ear canal normal. Tympanic membrane is bulging. Tympanic membrane is not erythematous. Tympanic membrane mobility is normal.   Left Ear: Ear canal normal. Tympanic membrane is bulging. Tympanic membrane is not erythematous. Tympanic membrane mobility is normal.   Nose: Mucosal edema and rhinorrhea present.   Mouth/Throat: Uvula is midline and mucous membranes are normal. Posterior oropharyngeal erythema present. No oropharyngeal exudate or tonsillar abscesses.   Eyes: EOM are normal. Pupils are equal, round, and reactive to light. Right eye exhibits no exudate. Left eye exhibits no exudate. Right conjunctiva is injected. Left conjunctiva is injected.   Cardiovascular: Normal rate and regular rhythm.   Pulmonary/Chest: Effort normal and breath sounds normal.   Abdominal: Soft. Bowel sounds are normal.   Musculoskeletal:  Normal range of motion.   Lymphadenopathy:     She has no cervical adenopathy.   Neurological: She is alert and oriented to person, place, and time.   Skin: Skin is warm and dry.   Psychiatric: She has a normal mood and affect.     Lab Results (last 24 hours)     ** No results found for the last 24 hours. **        Assessment/Plan   Camille was seen today for allergies.    Diagnoses and all orders for this visit:    Environmental and seasonal allergies  -     methylPREDNISolone (MEDROL, ALIX,) 4 MG tablet; Take as directed on package instructions.  -     loratadine-pseudoephedrine (CLARITIN-D 12 HOUR) 5-120 MG per 12 hr tablet; Take 1 tablet by mouth 2 (Two) Times a Day.    URI, acute  -     amoxicillin-clavulanate (AUGMENTIN) 875-125 MG per tablet; Take 1 tablet by mouth 2 (Two) Times a Day for 10 days.               Discussed impression and treatment options. AVS reviewed, understanding verbalized and agrees with treatment plan.  Follow up with primary care provider if no improvement within next several days. Go to [facility] or ER if condition worsens.

## 2019-05-20 ENCOUNTER — OFFICE VISIT (OUTPATIENT)
Dept: RETAIL CLINIC | Facility: CLINIC | Age: 54
End: 2019-05-20

## 2019-05-20 VITALS
SYSTOLIC BLOOD PRESSURE: 90 MMHG | BODY MASS INDEX: 19.05 KG/M2 | DIASTOLIC BLOOD PRESSURE: 60 MMHG | OXYGEN SATURATION: 99 % | WEIGHT: 128.6 LBS | TEMPERATURE: 98.2 F | HEIGHT: 69 IN | HEART RATE: 88 BPM | RESPIRATION RATE: 14 BRPM

## 2019-05-20 DIAGNOSIS — Z87.09 HISTORY OF ACUTE BACTERIAL SINUSITIS: Primary | ICD-10-CM

## 2019-05-20 DIAGNOSIS — F17.200 CURRENT EVERY DAY SMOKER: ICD-10-CM

## 2019-05-20 PROCEDURE — 99213 OFFICE O/P EST LOW 20 MIN: CPT | Performed by: NURSE PRACTITIONER

## 2019-05-20 NOTE — PROGRESS NOTES
History of Present Illness   Camille presents to the clinic today for follow up regarding a sinus infection which started on May 9th, 2019.  Associated symptoms at that time included chills, congestion, extreme fatigue, headache, sore throat, facial pressure and rapidly worsening symptoms. 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 She was seen at this clinic on May 11 th, 2019 by MELVINA Lezama and prescribed Augmentin 875-125 mg twice daily for ten days Claritin D twice daily and Medrol Darshan. She has completed this medication regimen and is feeling much better.  Refer to ROS for additional information.    Vitals:    05/20/19 0941   BP: 90/60   Pulse: 88   Resp: 14   Temp: 98.2 °F (36.8 °C)   SpO2: 99%     The following portions of the patient's history were reviewed and updated as appropriate: allergies, current medications, past family history, past medical history, past social history, past surgical history and problem list.    Review of Systems   Constitutional: Negative for appetite change, fatigue and fever.   HENT: Negative for congestion, ear pain, postnasal drip, rhinorrhea, sinus pressure and sore throat.    Eyes: Negative for discharge and redness.   Respiratory: Positive for cough (Occasional ). Negative for chest tightness, shortness of breath and wheezing.    Gastrointestinal: Negative for nausea and vomiting.   Musculoskeletal: Negative for myalgias.   Neurological: Negative for dizziness, light-headedness and headaches.   Hematological: Negative for adenopathy.   Psychiatric/Behavioral: Negative for sleep disturbance.   All other systems reviewed and are negative.    Physical Exam   Constitutional: She is oriented to person, place, and time. She appears well-developed and well-nourished. No distress.   HENT:   Head: Normocephalic.   Right Ear: Tympanic membrane and ear canal normal.   Left Ear: Tympanic membrane and ear canal normal.    Nose: Nose normal. Right sinus exhibits no maxillary sinus tenderness and no frontal sinus tenderness. Left sinus exhibits no maxillary sinus tenderness and no frontal sinus tenderness.   Mouth/Throat: Oropharynx is clear and moist. No oropharyngeal exudate.   Eyes: Conjunctivae are normal. Pupils are equal, round, and reactive to light. Right eye exhibits no discharge. Left eye exhibits no discharge. No scleral icterus.   Neck: Neck supple. No tracheal tenderness present.   Cardiovascular: Normal rate, regular rhythm and normal heart sounds. Exam reveals no friction rub.   No murmur heard.  Pulmonary/Chest: Effort normal and breath sounds normal. No respiratory distress. She has no wheezes. She has no rales.   Lymphadenopathy:     She has no cervical adenopathy.   Neurological: She is alert and oriented to person, place, and time.   Skin: Skin is warm and dry. Capillary refill takes less than 2 seconds. No rash noted. No erythema.   Vitals reviewed.    Assessment/Plan   Problems Addressed this Visit     None      Visit Diagnoses     History of acute bacterial sinusitis    -  Primary    Symptom free at this time    Current every day smoker        Smoking cessation encouraged.         Findings and recommendations discussed with Camille. Encouraged smoking cessation. Completed [facility] documents which [facility]. Copies were provided to her. Encouraged her to f/u with her PCP if her symptoms return and for her primary care needs.

## 2023-09-03 ENCOUNTER — LAB (OUTPATIENT)
Dept: LAB | Facility: HOSPITAL | Age: 58
End: 2023-09-03
Payer: COMMERCIAL

## 2023-09-03 ENCOUNTER — TRANSCRIBE ORDERS (OUTPATIENT)
Dept: ADMINISTRATIVE | Facility: HOSPITAL | Age: 58
End: 2023-09-03
Payer: COMMERCIAL

## 2023-09-03 DIAGNOSIS — F32.9 MAJOR DEPRESSIVE DISORDER WITH SINGLE EPISODE, REMISSION STATUS UNSPECIFIED: Primary | ICD-10-CM

## 2023-09-03 DIAGNOSIS — E78.5 HYPERLIPIDEMIA, UNSPECIFIED HYPERLIPIDEMIA TYPE: ICD-10-CM

## 2023-09-03 DIAGNOSIS — F32.9 MAJOR DEPRESSIVE DISORDER WITH SINGLE EPISODE, REMISSION STATUS UNSPECIFIED: ICD-10-CM

## 2023-09-03 PROCEDURE — 84436 ASSAY OF TOTAL THYROXINE: CPT

## 2023-09-03 PROCEDURE — 80050 GENERAL HEALTH PANEL: CPT

## 2023-09-03 PROCEDURE — 82607 VITAMIN B-12: CPT

## 2023-09-03 PROCEDURE — 82306 VITAMIN D 25 HYDROXY: CPT

## 2023-09-03 PROCEDURE — 80061 LIPID PANEL: CPT

## 2023-09-03 PROCEDURE — 84479 ASSAY OF THYROID (T3 OR T4): CPT

## 2023-09-03 PROCEDURE — 82746 ASSAY OF FOLIC ACID SERUM: CPT

## 2023-09-03 PROCEDURE — 36415 COLL VENOUS BLD VENIPUNCTURE: CPT

## 2023-09-04 LAB
25(OH)D3 SERPL-MCNC: 30.8 NG/ML (ref 30–100)
ALBUMIN SERPL-MCNC: 4.4 G/DL (ref 3.5–5.2)
ALBUMIN/GLOB SERPL: 1.6 G/DL
ALP SERPL-CCNC: 61 U/L (ref 39–117)
ALT SERPL W P-5'-P-CCNC: 16 U/L (ref 1–33)
ANION GAP SERPL CALCULATED.3IONS-SCNC: 10 MMOL/L (ref 5–15)
AST SERPL-CCNC: 21 U/L (ref 1–32)
BASOPHILS # BLD AUTO: 0.05 10*3/MM3 (ref 0–0.2)
BASOPHILS NFR BLD AUTO: 0.8 % (ref 0–1.5)
BILIRUB SERPL-MCNC: 0.3 MG/DL (ref 0–1.2)
BUN SERPL-MCNC: 11 MG/DL (ref 6–20)
BUN/CREAT SERPL: 12.8 (ref 7–25)
CALCIUM SPEC-SCNC: 9.2 MG/DL (ref 8.6–10.5)
CHLORIDE SERPL-SCNC: 106 MMOL/L (ref 98–107)
CHOLEST SERPL-MCNC: 199 MG/DL (ref 0–200)
CO2 SERPL-SCNC: 25 MMOL/L (ref 22–29)
CREAT SERPL-MCNC: 0.86 MG/DL (ref 0.57–1)
DEPRECATED RDW RBC AUTO: 43.9 FL (ref 37–54)
EGFRCR SERPLBLD CKD-EPI 2021: 79.4 ML/MIN/1.73
EOSINOPHIL # BLD AUTO: 0.19 10*3/MM3 (ref 0–0.4)
EOSINOPHIL NFR BLD AUTO: 2.9 % (ref 0.3–6.2)
ERYTHROCYTE [DISTWIDTH] IN BLOOD BY AUTOMATED COUNT: 13.6 % (ref 12.3–15.4)
FOLATE SERPL-MCNC: 16.7 NG/ML (ref 4.78–24.2)
GLOBULIN UR ELPH-MCNC: 2.7 GM/DL
GLUCOSE SERPL-MCNC: 111 MG/DL (ref 65–99)
HCT VFR BLD AUTO: 46.8 % (ref 34–46.6)
HDLC SERPL-MCNC: 61 MG/DL (ref 40–60)
HGB BLD-MCNC: 15.3 G/DL (ref 12–15.9)
IMM GRANULOCYTES # BLD AUTO: 0.01 10*3/MM3 (ref 0–0.05)
IMM GRANULOCYTES NFR BLD AUTO: 0.2 % (ref 0–0.5)
LDLC SERPL CALC-MCNC: 117 MG/DL (ref 0–100)
LDLC/HDLC SERPL: 1.88 {RATIO}
LYMPHOCYTES # BLD AUTO: 2.37 10*3/MM3 (ref 0.7–3.1)
LYMPHOCYTES NFR BLD AUTO: 35.9 % (ref 19.6–45.3)
MCH RBC QN AUTO: 28.6 PG (ref 26.6–33)
MCHC RBC AUTO-ENTMCNC: 32.7 G/DL (ref 31.5–35.7)
MCV RBC AUTO: 87.5 FL (ref 79–97)
MONOCYTES # BLD AUTO: 0.38 10*3/MM3 (ref 0.1–0.9)
MONOCYTES NFR BLD AUTO: 5.7 % (ref 5–12)
NEUTROPHILS NFR BLD AUTO: 3.61 10*3/MM3 (ref 1.7–7)
NEUTROPHILS NFR BLD AUTO: 54.5 % (ref 42.7–76)
NRBC BLD AUTO-RTO: 0 /100 WBC (ref 0–0.2)
PLATELET # BLD AUTO: 201 10*3/MM3 (ref 140–450)
PMV BLD AUTO: 10.8 FL (ref 6–12)
POTASSIUM SERPL-SCNC: 4.1 MMOL/L (ref 3.5–5.2)
PROT SERPL-MCNC: 7.1 G/DL (ref 6–8.5)
RBC # BLD AUTO: 5.35 10*6/MM3 (ref 3.77–5.28)
SODIUM SERPL-SCNC: 141 MMOL/L (ref 136–145)
T-UPTAKE NFR SERPL: 1.17 TBI (ref 0.8–1.3)
T4 SERPL-MCNC: 8.51 MCG/DL (ref 4.5–11.7)
TRIGL SERPL-MCNC: 116 MG/DL (ref 0–150)
TSH SERPL DL<=0.05 MIU/L-ACNC: 2 UIU/ML (ref 0.27–4.2)
VIT B12 BLD-MCNC: 469 PG/ML (ref 211–946)
VLDLC SERPL-MCNC: 21 MG/DL (ref 5–40)
WBC NRBC COR # BLD: 6.61 10*3/MM3 (ref 3.4–10.8)

## 2023-09-11 ENCOUNTER — TRANSCRIBE ORDERS (OUTPATIENT)
Dept: ADMINISTRATIVE | Facility: HOSPITAL | Age: 58
End: 2023-09-11
Payer: COMMERCIAL

## 2023-09-11 DIAGNOSIS — Z87.891 PERSONAL HISTORY OF SMOKING: Primary | ICD-10-CM

## 2023-09-11 DIAGNOSIS — Z87.891 PERSONAL HISTORY OF TOBACCO USE: Primary | ICD-10-CM

## 2023-09-23 ENCOUNTER — HOSPITAL ENCOUNTER (OUTPATIENT)
Dept: CT IMAGING | Facility: HOSPITAL | Age: 58
Discharge: HOME | End: 2023-09-23
Admitting: NURSE PRACTITIONER
Payer: COMMERCIAL

## 2023-09-23 DIAGNOSIS — Z87.891 PERSONAL HISTORY OF SMOKING: ICD-10-CM

## 2023-09-23 PROCEDURE — 71271 CT THORAX LUNG CANCER SCR C-: CPT

## 2023-12-15 ENCOUNTER — TRANSCRIBE ORDERS (OUTPATIENT)
Dept: PHYSICAL THERAPY | Facility: CLINIC | Age: 58
End: 2023-12-15
Payer: COMMERCIAL

## 2023-12-15 ENCOUNTER — TREATMENT (OUTPATIENT)
Dept: PHYSICAL THERAPY | Facility: CLINIC | Age: 58
End: 2023-12-15
Payer: OTHER MISCELLANEOUS

## 2023-12-15 DIAGNOSIS — S33.5XXA LUMBAR SPRAIN, INITIAL ENCOUNTER: Primary | ICD-10-CM

## 2023-12-15 DIAGNOSIS — M79.604 RIGHT LEG PAIN: ICD-10-CM

## 2023-12-15 DIAGNOSIS — S39.012D STRAIN OF LUMBAR REGION, SUBSEQUENT ENCOUNTER: Primary | ICD-10-CM

## 2023-12-15 PROCEDURE — 97110 THERAPEUTIC EXERCISES: CPT | Performed by: PHYSICAL THERAPIST

## 2023-12-15 PROCEDURE — 97162 PT EVAL MOD COMPLEX 30 MIN: CPT | Performed by: PHYSICAL THERAPIST

## 2023-12-15 PROCEDURE — 97014 ELECTRIC STIMULATION THERAPY: CPT | Performed by: PHYSICAL THERAPIST
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
## 2024-01-04 ENCOUNTER — TREATMENT (OUTPATIENT)
Dept: PHYSICAL THERAPY | Facility: CLINIC | Age: 59
End: 2024-01-04
Payer: OTHER MISCELLANEOUS

## 2024-01-04 DIAGNOSIS — S39.012D STRAIN OF LUMBAR REGION, SUBSEQUENT ENCOUNTER: Primary | ICD-10-CM

## 2024-01-04 DIAGNOSIS — M79.604 RIGHT LEG PAIN: ICD-10-CM

## 2024-01-04 PROCEDURE — 97140 MANUAL THERAPY 1/> REGIONS: CPT | Performed by: PHYSICAL THERAPIST

## 2024-01-04 PROCEDURE — 97014 ELECTRIC STIMULATION THERAPY: CPT | Performed by: PHYSICAL THERAPIST

## 2024-01-04 PROCEDURE — 97110 THERAPEUTIC EXERCISES: CPT | Performed by: PHYSICAL THERAPIST
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
## 2024-01-09 ENCOUNTER — TREATMENT (OUTPATIENT)
Dept: PHYSICAL THERAPY | Facility: CLINIC | Age: 59
End: 2024-01-09
Payer: OTHER MISCELLANEOUS

## 2024-01-09 DIAGNOSIS — M79.604 RIGHT LEG PAIN: ICD-10-CM

## 2024-01-09 DIAGNOSIS — S39.012D STRAIN OF LUMBAR REGION, SUBSEQUENT ENCOUNTER: Primary | ICD-10-CM

## 2024-01-09 NOTE — PROGRESS NOTES
Physical Therapy Daily Treatment Note      Patient: Camille Garvin   : 1965  Referring practitioner: MELVINA Min  Date of Initial Visit: Type: THERAPY  Noted: 12/15/2023  Today's Date: 2024  Patient seen for 3 sessions       Visit Diagnoses:    ICD-10-CM ICD-9-CM   1. Strain of lumbar region, subsequent encounter  S39.012D V58.89     847.2   2. Right leg pain  M79.604 729.5       Subjective Evaluation    History of Present Illness    Subjective comment: Pt has 3/10 pain today.     Objective   See Exercise, Manual, and Modality Logs for complete treatment.       Assessment & Plan       Assessment  Assessment details: Tx today consisted of exercises for improved centralization of symptoms and improved core stability; followed by stm to lower back and ended with ice and estim for decreased pain.  Pt demonstrated good effort with no pain following session.     Plan  Plan details: Will follow progressing core stability and decreased pain.          Timed:         Manual Therapy:    13     mins  32975;     Therapeutic Exercise:    27     mins  20269;     Neuromuscular Gena:        mins  59611;    Therapeutic Activity:          mins  10238;     Gait Training:           mins  40306;     Ultrasound:          mins  02485;    Ionto                                   mins   77174  Self Care                            mins   47513  Canalith Repos         mins 31042      Un-Timed:  Electrical Stimulation:    10     mins  51120 ( );  Dry Needling          mins self-pay  Traction          mins 43988      Timed Treatment:   40   mins   Total Treatment:     50   mins    Ge Shaw PT  [place] License: [id number]      Electronically signed by Ge Shaw PT, 24, 9:07 AM EST

## 2024-01-11 ENCOUNTER — TREATMENT (OUTPATIENT)
Dept: PHYSICAL THERAPY | Facility: CLINIC | Age: 59
End: 2024-01-11
Payer: OTHER MISCELLANEOUS

## 2024-01-11 DIAGNOSIS — S39.012D STRAIN OF LUMBAR REGION, SUBSEQUENT ENCOUNTER: Primary | ICD-10-CM

## 2024-01-11 DIAGNOSIS — M79.604 RIGHT LEG PAIN: ICD-10-CM

## 2024-01-11 PROCEDURE — 97140 MANUAL THERAPY 1/> REGIONS: CPT | Performed by: PHYSICAL THERAPIST

## 2024-01-11 PROCEDURE — 97110 THERAPEUTIC EXERCISES: CPT | Performed by: PHYSICAL THERAPIST

## 2024-01-11 PROCEDURE — 97014 ELECTRIC STIMULATION THERAPY: CPT | Performed by: PHYSICAL THERAPIST
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
## 2024-01-23 ENCOUNTER — TREATMENT (OUTPATIENT)
Dept: PHYSICAL THERAPY | Facility: CLINIC | Age: 59
End: 2024-01-23
Payer: COMMERCIAL

## 2024-01-23 DIAGNOSIS — M79.604 RIGHT LEG PAIN: ICD-10-CM

## 2024-01-23 DIAGNOSIS — S39.012D STRAIN OF LUMBAR REGION, SUBSEQUENT ENCOUNTER: Primary | ICD-10-CM

## 2024-01-23 NOTE — PROGRESS NOTES
"  Progress Note    Patient: Camille Garvin   : 1965  Diagnosis/ICD-10 Code:  Strain of lumbar region, subsequent encounter [S39.012D]  Referring practitioner: MELVINA Min  Date of Initial Visit: Type: THERAPY  Noted: 12/15/2023  Today's Date: 2024  Patient seen for 5 sessions         Visit Diagnoses:    ICD-10-CM ICD-9-CM   1. Strain of lumbar region, subsequent encounter  S39.012D V58.89     847.2   2. Right leg pain  M79.604 729.5         Subjective Questionnaire: Oswestry: 14% impaired  Clinical Progress: improved  Home Program Compliance: Yes      Subjective Evaluation    History of Present Illness    Subjective comment: Pt reports pain is \"not too bad today\". She states pain is currently 2/10. The patient reports she has bought a cushion to sit on while driving, which helps with posture and pain.Pain  Current pain ratin           Objective          Postural Observations    Additional Postural Observation Details  Rounded posture    Palpation   Left   Tenderness of the erector spinae.     Right Tenderness of the erector spinae.     Additional Palpation Details  Tenderness along left glut and right hamstring    Active Range of Motion     Lumbar   Flexion: WFL  Extension: Active lumbar extension: 75% available.   Left rotation: Active left lumbar rotation: 75% available.   Right rotation: Active right lumbar rotation: 75% available.     Strength/Myotome Testing     Left Hip   Planes of Motion   Flexion: 4+    Right Hip   Planes of Motion   Flexion: 4+    Left Knee   Flexion: 4+  Extension: 4+    Right Knee   Flexion: 4+  Extension: 4+    Tests       Thoracic   Negative slump.     Additional Tests Details              Assessment & Plan       Assessment  Impairments: abnormal gait, abnormal muscle firing, abnormal muscle tone, abnormal or restricted ROM, activity intolerance, impaired physical strength and pain with function   Functional limitations: carrying objects, lifting, pulling, " pushing, uncomfortable because of pain, standing, stooping and unable to perform repetitive tasks   Assessment details: Outcome measures were obtained during today's session, with patient demonstrating improved lumbar ROM and BLE strength. The patient reported a 14% impairment on the Modified Oswestry, which compares to 32% impairment on 12/15/23. She has achieved 2/3 STG and is progressing toward long term goals. The patient would benefit from continued skilled physical therapy to further address functional limitations and impairments. She will be progressed as indicated to achieve max function.  Today's session was initiated with STM to right lumbar paraspinals, with patient in the left side-lying position, addressing muscle tightness. Therapeutic exercises were performed for improved lumbar ROM, BLE strength, scapular stability, and upright posture. Tactile and verbal cues were provided for proper form. Today's session concluded with cryotherapy combined with IFC to the lumbar region, addressing pain and inflammation, with no skin irritation observed. The patient reported 0/10 lumbar pain following today's session.  Prognosis: good    Goals  Plan Goals: STG 6 weeks    1. Pt tess be instructed in a HEP.      Met  2. Pt will improve her Mod Oswestry to less than 20%.      Met: 14% impaired  3. Pt will report pain no greater than 4/10.       Ongoin/10, per pt report    LTG 12 weeks    1. Pt will be independent with a progressive core stability program by demonstration of increased reps and resistance.      Ongoing  2. Pt will improve her MOD Oswestry to less than 10%.      Progressin% impaired  3. Pt will demonstrate full lumbar ROM.      Partially Met  4. Pt will report a 75% decrease in radicular symptoms.      Progressin% improvement    Plan  Therapy options: will be seen for skilled therapy services  Planned modality interventions: cryotherapy, TENS, thermotherapy (hydrocollator packs) and  ultrasound  Planned therapy interventions: abdominal trunk stabilization, ADL retraining, flexibility, functional ROM exercises, gait training, home exercise program, IADL retraining, joint mobilization, manual therapy, neuromuscular re-education, postural training, soft tissue mobilization, spinal/joint mobilization, strengthening, stretching and therapeutic activities  Frequency: 2x week  Duration in weeks: 8  Treatment plan discussed with: patient  Plan details: Will follow for optimal gains.         Recommendations: Continue as planned  Timeframe: 2 months  Prognosis to achieve goals: good      Timed:         Manual Therapy:    10     mins  27028;     Therapeutic Exercise:    25     mins  75665;     Neuromuscular Gena:        mins  57860;    Therapeutic Activity:          mins  33463;     Gait Training:           mins  25763;     Ultrasound:          mins  13146;    Ionto                                   mins   27711  Self Care                            mins   09806    Un-Timed:  Electrical Stimulation:    10     mins  77001 ( );  Dry Needling          mins self-pay  Traction          mins 88864  Re-Eval                               mins  19246  Canalith Repos         mins 48287    Timed Treatment:   35   mins   Total Treatment:     45   mins          PT: Ashley Claudene Dalton, PT     [place] License:  [id number]    Electronically signed by Ashley Claudene Dalton, PT, 01/23/24, 3:03 PM EST

## 2024-01-31 ENCOUNTER — TREATMENT (OUTPATIENT)
Dept: PHYSICAL THERAPY | Facility: CLINIC | Age: 59
End: 2024-01-31
Payer: OTHER MISCELLANEOUS

## 2024-01-31 DIAGNOSIS — M79.604 RIGHT LEG PAIN: ICD-10-CM

## 2024-01-31 DIAGNOSIS — S39.012D STRAIN OF LUMBAR REGION, SUBSEQUENT ENCOUNTER: Primary | ICD-10-CM
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
## 2024-02-01 ENCOUNTER — TREATMENT (OUTPATIENT)
Dept: PHYSICAL THERAPY | Facility: CLINIC | Age: 59
End: 2024-02-01
Payer: COMMERCIAL

## 2024-02-01 DIAGNOSIS — M79.604 RIGHT LEG PAIN: ICD-10-CM

## 2024-02-01 DIAGNOSIS — S39.012D STRAIN OF LUMBAR REGION, SUBSEQUENT ENCOUNTER: Primary | ICD-10-CM

## 2024-02-01 NOTE — PROGRESS NOTES
Physical Therapy Daily Treatment Note      Patient: Camille Garvin   : 1965  Referring practitioner: MELVINA Min  Date of Initial Visit: Type: THERAPY  Noted: 12/15/2023  Today's Date: 2024  Patient seen for 7 sessions       Visit Diagnoses:    ICD-10-CM ICD-9-CM   1. Strain of lumbar region, subsequent encounter  S39.012D V58.89     847.2   2. Right leg pain  M79.604 729.5       Subjective Evaluation    History of Present Illness    Subjective comment: Patient reports 0/10 pain today.  She notes that she tolerated exercise progression well at last session.Pain  Current pain ratin           Objective   See Exercise, Manual, and Modality Logs for complete treatment.       Assessment & Plan       Assessment  Assessment details: Treatment session initiated with there ex and therapeutic activities, followed by manual therapy.  Exercises progressed to include standing hip abduction, standing hip extension, and lat pulls with theraband.  Cues provided during exercise for improved posture to ensure maximum benefits and gains.  Patient reported greater tenderness at right lumbar paraspinals than left during STM.  She noted 0/10 pain pre- and post-tx. She will continue to be progressed per her tolerance and POC.          Timed:         Manual Therapy:    10     mins  28032;     Therapeutic Exercise:    36     mins  18877;     Neuromuscular Gena:        mins  25233;    Therapeutic Activity:     11     mins  97992;     Gait Training:           mins  04387;     Ultrasound:          mins  09758;    Ionto                                   mins   97613  Self Care                            mins   21110      Un-Timed:  Electrical Stimulation:         mins  17901 (MC );  Dry Needling          mins self-pay  Traction          mins 93512  Canalith Repos         mins 88607    Timed Treatment:   57   mins   Total Treatment:     57   mins    Paula Bowling, PT  [place] License: [id number]  Electronically signed  by Paula Bowling, PT, 02/01/24, 4:43 PM EST.

## 2024-02-06 ENCOUNTER — TREATMENT (OUTPATIENT)
Dept: PHYSICAL THERAPY | Facility: CLINIC | Age: 59
End: 2024-02-06
Payer: OTHER MISCELLANEOUS

## 2024-02-06 DIAGNOSIS — S39.012D STRAIN OF LUMBAR REGION, SUBSEQUENT ENCOUNTER: Primary | ICD-10-CM

## 2024-02-06 DIAGNOSIS — M79.604 RIGHT LEG PAIN: ICD-10-CM

## 2024-02-06 NOTE — PROGRESS NOTES
Physical Therapy Daily Treatment Note      Patient: Camille Garvin   : 1965  Referring practitioner: MELVINA Min  Date of Initial Visit: Type: THERAPY  Noted: 12/15/2023  Today's Date: 2024  Patient seen for 8 sessions       Visit Diagnoses:    ICD-10-CM ICD-9-CM   1. Strain of lumbar region, subsequent encounter  S39.012D V58.89     847.2   2. Right leg pain  M79.604 729.5       Subjective Evaluation    History of Present Illness    Subjective comment: Patient reports that she has not felt good today, noting 2/10 pain.Pain  Current pain ratin         Objective   See Exercise, Manual, and Modality Logs for complete treatment.       Assessment & Plan       Assessment  Assessment details: Patient reported good tolerance with exercise at today's session, noting no pain post-tx.  Therapy session initiated with there ex and therapeutic activities, per flow sheet.  Exercises progressed from supine clams to sidelying clams.  Patient required minimal cues for improved posture during session.  Patient reported minimal tenderness during STM to the lumbar musculature.  She will continue to be progressed per her tolerance and POC.          Timed:         Manual Therapy:    10     mins  63351;     Therapeutic Exercise:    35     mins  86693;     Neuromuscular Gena:        mins  43010;    Therapeutic Activity:     10     mins  42969;     Gait Training:           mins  67290;     Ultrasound:          mins  15737;    Ionto                                   mins   57440  Self Care                            mins   79069      Un-Timed:  Electrical Stimulation:         mins  23384 ( );  Dry Needling          mins self-pay  Traction          mins 92992  Canalith Repos         mins 99972    Timed Treatment:   55   mins   Total Treatment:     55   mins    Paula Bowling PT  [place] License: [id number]  Electronically signed by Paula Bowling PT, 24, 3:55 PM EST.

## 2024-02-08 ENCOUNTER — TREATMENT (OUTPATIENT)
Dept: PHYSICAL THERAPY | Facility: CLINIC | Age: 59
End: 2024-02-08
Payer: COMMERCIAL

## 2024-02-08 DIAGNOSIS — S39.012D STRAIN OF LUMBAR REGION, SUBSEQUENT ENCOUNTER: Primary | ICD-10-CM

## 2024-02-08 DIAGNOSIS — M79.604 RIGHT LEG PAIN: ICD-10-CM

## 2024-02-08 NOTE — PROGRESS NOTES
Physical Therapy Daily Treatment Note      Patient: Camille Garvin   : 1965  Referring practitioner: MELVINA Min  Date of Initial Visit: Type: THERAPY  Noted: 12/15/2023  Today's Date: 2024  Patient seen for 9 sessions       Visit Diagnoses:    ICD-10-CM ICD-9-CM   1. Strain of lumbar region, subsequent encounter  S39.012D V58.89     847.2   2. Right leg pain  M79.604 729.5       Subjective Evaluation    History of Present Illness    Subjective comment: Patient reports no pain in the lumbar region or right leg prior to today's session. Patient reports bumps in the road is usually what sets off the pain. Patient reports she has been performing HEP and things have been going well.Pain  Current pain ratin         Objective   See Exercise, Manual, and Modality Logs for complete treatment.       Assessment & Plan       Assessment  Assessment details: Today's session began with STM to the right lumbar region including all spinal extensors to increase tissue extensibility and decrease pain in the region. Therapeutic exercise was then performed to improve lumbar AROM and strength as well bilateral lower extremity functional strength. Verbal cues were provided to patient during seated trunk rotation to lead with shoulder to get optimal rotational movement in lumbar spine segments. Verbal and tactile cues were also utilized to aid in movement pattern for standing hip abduction to target glute medius for strengthening. Squatting was progressed to a deeper depth in order to improve mobility and strength in deeper flexion ROM. Therapeutic activity was progressed to include standing hip hinges to improve hip extension strength in weight bearing as well as improve hip hinge pattern for bending to  objects from the floor. Patient tolerated all progression and activity well without complaint. Session was concluded with cryotherapy and IFC to the lumbar region in order to decrease pain. Patient  reports pain at the end of today's session.     Plan  Plan details: Therapeutic activity will be progressed to include lifting objects off of the floor, carrying weighted objects, and squatting to deeper depths with added weight in order to improve ability to lift objects at work from the floor and carry needed equipment.         Timed:         Manual Therapy:    8     mins  30958;     Therapeutic Exercise:    24     mins  24820;     Neuromuscular Gena:        mins  41033;    Therapeutic Activity:     16     mins  38523;     Gait Training:           mins  26283;     Ultrasound:          mins  73542;    Ionto                                   mins   00261  Self Care                            mins   31053      Un-Timed:  Electrical Stimulation:    10     mins  07517 ( );  Dry Needling          mins self-pay  Traction          mins 41887  Canalith Repos         mins 85105    Timed Treatment:   48   mins   Total Treatment:     58   mins    Renetta Veloz PT Student    Ashley Claudene Dalton, PT [place] License: [id number]

## 2024-02-13 ENCOUNTER — TREATMENT (OUTPATIENT)
Dept: PHYSICAL THERAPY | Facility: CLINIC | Age: 59
End: 2024-02-13
Payer: COMMERCIAL

## 2024-02-13 DIAGNOSIS — S39.012D STRAIN OF LUMBAR REGION, SUBSEQUENT ENCOUNTER: Primary | ICD-10-CM

## 2024-02-13 DIAGNOSIS — M79.604 RIGHT LEG PAIN: ICD-10-CM

## 2024-02-15 ENCOUNTER — TREATMENT (OUTPATIENT)
Dept: PHYSICAL THERAPY | Facility: CLINIC | Age: 59
End: 2024-02-15
Payer: OTHER MISCELLANEOUS

## 2024-02-15 DIAGNOSIS — M79.604 RIGHT LEG PAIN: ICD-10-CM

## 2024-02-15 DIAGNOSIS — S39.012D STRAIN OF LUMBAR REGION, SUBSEQUENT ENCOUNTER: Primary | ICD-10-CM

## 2024-02-15 PROCEDURE — 97014 ELECTRIC STIMULATION THERAPY: CPT | Performed by: PHYSICAL THERAPIST

## 2024-02-15 PROCEDURE — 97110 THERAPEUTIC EXERCISES: CPT | Performed by: PHYSICAL THERAPIST

## 2024-02-15 PROCEDURE — 97530 THERAPEUTIC ACTIVITIES: CPT | Performed by: PHYSICAL THERAPIST

## 2024-02-20 ENCOUNTER — TREATMENT (OUTPATIENT)
Dept: PHYSICAL THERAPY | Facility: CLINIC | Age: 59
End: 2024-02-20
Payer: OTHER MISCELLANEOUS

## 2024-02-20 DIAGNOSIS — S39.012D STRAIN OF LUMBAR REGION, SUBSEQUENT ENCOUNTER: Primary | ICD-10-CM

## 2024-02-20 DIAGNOSIS — M79.604 RIGHT LEG PAIN: ICD-10-CM

## 2024-02-20 PROCEDURE — 97530 THERAPEUTIC ACTIVITIES: CPT | Performed by: PHYSICAL THERAPIST

## 2024-02-20 PROCEDURE — 97110 THERAPEUTIC EXERCISES: CPT | Performed by: PHYSICAL THERAPIST

## 2024-02-20 NOTE — PROGRESS NOTES
Physical Therapy Daily Treatment Note/Discharge      Patient: Camille Garvin   : 1965  Referring practitioner: MELVINA Min  Date of Initial Visit: Type: THERAPY  Noted: 12/15/2023  Today's Date: 2024  Patient seen for 12 sessions       Visit Diagnoses:    ICD-10-CM ICD-9-CM   1. Strain of lumbar region, subsequent encounter  S39.012D V58.89     847.2   2. Right leg pain  M79.604 729.5     Modified Oswestry: 4% impaired    Subjective Evaluation    History of Present Illness    Subjective comment: Patient reports having no lower back pain prior to today's session. Patient reports that it has been at least a week and a half since she has felt any pain. Patient reports she has done better with performing exercises at home. Patient lastly reports she has no pain with any duties at work and is ready for discharge.       Objective          Palpation   Left   No palpable tenderness to the erector spinae.     Right   No palpable tenderness to the erector spinae.     Additional Palpation Details  No palpable tenderness in left glute or right hamstring as reported previously.    Active Range of Motion     Lumbar   Flexion: WFL  Extension: WFL  Left lateral flexion: WFL  Right lateral flexion: Active right lumbar lateral flexion: 10% restricted compared to other side but WFL. WFL  Left rotation: WFL  Right rotation: WFL    Strength/Myotome Testing     Left Hip   Planes of Motion   Flexion: 4+    Right Hip   Planes of Motion   Flexion: 4+    Left Knee   Flexion: 4+  Extension: 4+    Right Knee   Flexion: 4+  Extension: 4+      See Exercise, Manual, and Modality Logs for complete treatment.       Assessment & Plan       Assessment  Assessment details: Outcome measures were obtained during today's session, with patient demonstrating functional lumbar ROM and BLE strength with no pain. The patient reported a 4% impairment on the Modified Oswestry, which compares to 14% impairment on 23. She has achieved  3/3 STG and 4/4 LTG.   Today's session began with therapeutic exercises that were performed for improved lumbar ROM, BLE strength, scapular stability, and upright posture. The patient reported 0/10 lumbar pain following today's session and was discharged upon reporting ability to perform all job activities with no pain. The patient was educated on how to perform HEP, with written and digital copy provided. Lastly, patient was educated to return to provider if pain were to return or other concerns arise. Patient demonstrated understanding and reported she was comfortable with discharge at this time continuing HEP independently.  Prognosis: good    Goals  Plan Goals: STG 6 weeks    1. Pt tess be instructed in a HEP.      Met  2. Pt will improve her Mod Oswestry to less than 20%.      Met: 4%  3. Pt will report pain no greater than 4/10.       Met: No pain in a week and a half    LTG 12 weeks    1. Pt will be independent with a progressive core stability program by demonstration of increased reps and resistance.      Met  2. Pt will improve her MOD Oswestry to less than 10%.      Met: 4%  3. Pt will demonstrate full lumbar ROM.      Met  4. Pt will report a 75% decrease in radicular symptoms.      Met    Plan  Treatment plan discussed with: patient  Plan details: Discharge patient at this time. Patient is educated to return to primary care doctor if any pain or concerns return.         Timed:         Manual Therapy:         mins  30237;     Therapeutic Exercise:    29     mins  78748;     Neuromuscular Gena:        mins  69336;    Therapeutic Activity:     11     mins  24778;     Gait Training:           mins  50699;     Ultrasound:          mins  03557;    Ionto                                   mins   54759  Self Care                            mins   90661      Un-Timed:  Electrical Stimulation:         mins  63016   Dry Needling          mins self-pay  Traction          mins 16123  Canalith Repos         mins  39084    Timed Treatment:   40   mins   Total Treatment:     40   mins    Renetta Veloz, PT Student    Ashley Claudene Dalton, PT  [place] License: [id number]

## 2024-03-05 ENCOUNTER — TRANSCRIBE ORDERS (OUTPATIENT)
Dept: ADMINISTRATIVE | Facility: HOSPITAL | Age: 59
End: 2024-03-05
Payer: COMMERCIAL

## 2024-03-05 ENCOUNTER — LAB (OUTPATIENT)
Dept: LAB | Facility: HOSPITAL | Age: 59
End: 2024-03-05
Payer: COMMERCIAL

## 2024-03-05 DIAGNOSIS — J44.1 OBSTRUCTIVE CHRONIC BRONCHITIS WITH EXACERBATION: ICD-10-CM

## 2024-03-05 DIAGNOSIS — R05.9 COUGH, UNSPECIFIED TYPE: Primary | ICD-10-CM

## 2024-03-05 DIAGNOSIS — R05.9 COUGH, UNSPECIFIED TYPE: ICD-10-CM

## 2024-03-05 PROCEDURE — 0202U NFCT DS 22 TRGT SARS-COV-2: CPT
